# Patient Record
Sex: MALE | Race: OTHER | Employment: FULL TIME | URBAN - METROPOLITAN AREA
[De-identification: names, ages, dates, MRNs, and addresses within clinical notes are randomized per-mention and may not be internally consistent; named-entity substitution may affect disease eponyms.]

---

## 2024-11-09 ENCOUNTER — HOSPITAL ENCOUNTER (EMERGENCY)
Age: 29
Discharge: HOME | End: 2024-11-09
Attending: EMERGENCY MEDICINE

## 2024-11-09 VITALS
OXYGEN SATURATION: 100 % | TEMPERATURE: 98 F | DIASTOLIC BLOOD PRESSURE: 64 MMHG | WEIGHT: 165 LBS | HEART RATE: 88 BPM | BODY MASS INDEX: 30.36 KG/M2 | SYSTOLIC BLOOD PRESSURE: 119 MMHG | HEIGHT: 62 IN | RESPIRATION RATE: 16 BRPM

## 2024-11-09 DIAGNOSIS — L73.8 FOLLICULITIS BARBAE: Primary | ICD-10-CM

## 2024-11-09 PROCEDURE — 99282 EMERGENCY DEPT VISIT SF MDM: CPT

## 2024-11-09 ASSESSMENT — PAIN - FUNCTIONAL ASSESSMENT: PAIN_FUNCTIONAL_ASSESSMENT: 0-10

## 2024-11-09 ASSESSMENT — LIFESTYLE VARIABLES
HOW OFTEN DO YOU HAVE A DRINK CONTAINING ALCOHOL: NEVER
HOW MANY STANDARD DRINKS CONTAINING ALCOHOL DO YOU HAVE ON A TYPICAL DAY: PATIENT DOES NOT DRINK

## 2024-11-09 ASSESSMENT — PAIN DESCRIPTION - LOCATION: LOCATION: HEAD

## 2024-11-09 ASSESSMENT — PAIN SCALES - GENERAL: PAINLEVEL_OUTOF10: 10

## 2024-11-09 NOTE — DISCHARGE INSTRUCTIONS
As discussed with your please do not shave your hair to the skin level; trim your hair at low but not to the skin and 1-year-old treatment your hair follow the hair grade instead of coming against it  If the problem you are having does not resolve please consult with Dr. Filomena Lopez

## 2024-11-09 NOTE — ED PROVIDER NOTES
Emergency Department Encounter    Patient: Ilan Gomez  MRN: [id number]  : 1995  Date of Evaluation: 2024  ED Provider:  Baljinder Reed MD    Triage Chief Complaint:   Mass (Multiple lumps on head for past few months )    [place]:  Ilan Gomez is a 29 y.o. male [place] that presents to the emergency room concerning about pimples in the hair part of his scalp (patient is balding).  Patient states he shaves his hair sitting shaving machine and he is using to the level of the skin.  He did not see any purulent discharge or erythema.  Patient states this is not going on for many weeks    ROS - see HPI, below listed is current ROS at time of my eval:  General:  No fevers, no chills, no weakness  Eyes:  No recent vison changes, no discharge  ENT: Pimples in the hairy of the scalp  Cardiovascular:  No chest pain, no palpitations  Respiratory:  No shortness of breath, no cough, no wheezing  Gastrointestinal:  No pain, no nausea, no vomiting, no diarrhea  Musculoskeletal:  No muscle pain, no joint pain  Skin:  No rash, no pruritis, no easy bruising  Neurologic:  No speech problems, no headache, no extremity numbness, no extremity tingling, no extremity weakness      No past medical history on file.  No past surgical history on file.  No family history on file.  Social History     Socioeconomic History    Marital status: Single     Spouse name: Not on file    Number of children: Not on file    Years of education: Not on file    Highest education level: Not on file   Occupational History    Not on file   Tobacco Use    Smoking status: Not on file    Smokeless tobacco: Not on file   Substance and Sexual Activity    Alcohol use: Not on file    Drug use: Not on file    Sexual activity: Not on file   Other Topics Concern    Not on file   Social History Narrative    Not on file     Social Determinants of Health     Financial Resource Strain: Not on file   Food Insecurity: Not on file   Transportation Needs: Not on  of his hair to the level of the skin which I told him to refrain from and also follow the graying of the hair when he shaves his hair and hopefully this would help.  If this is not helping him he was advised to follow-up with the provider of the day.    Clinical Impression:  1. Folliculitis barbae      Disposition referral (if applicable):  Filomena Lopez, MYRA - CNP  [address] [address]  [phone]          Disposition medications (if applicable):  New Prescriptions    No medications on file     ED Provider Disposition Time  DISPOSITION Decision To Discharge 11/09/2024 03:43:56 PM           Comment: Please note this report has been produced using speech recognition software and may contain errors related to that system including errors in grammar, punctuation, and spelling, as well as words and phrases that may be inappropriate.  Efforts were made to edit the dictations.       Baljinder Reed MD  11/09/24 2178